# Patient Record
(demographics unavailable — no encounter records)

---

## 2024-10-18 NOTE — DISCUSSION/SUMMARY
[Doing Well] : is doing well [Excellent Pain Control] : has excellent pain control [No Sign of Infection] : is showing no signs of infection [Clinical Recheck] : clinical recheck [FreeTextEntry1] : Independent review of imaging and independent interpretation was performed at today's visit:  9/21/24 chest XRAY at [facility] - Pulmonary nodules and groundglass opacities seen on the patient's prior CT scans are not demonstrated on this modality. - No acute cardiopulmonary pathology

## 2024-10-18 NOTE — REASON FOR VISIT
[de-identified] : Flex Bronch, Right Robotic Asissted Right Upper Lobectomy with Mediastinal LN Dissection [de-identified] : 8/7/24 [de-identified] : Final pathology shows B-cell lymphoma.  Pt is doing well clinically.  She complains of an intermittent dry cough.

## 2024-10-18 NOTE — ASSESSMENT
[FreeTextEntry1] : Ms. Cazares has a diagnosis of B-cell lymphoma.  She is being followed by Dr. Alexander of medical oncology.  She will have a PET scan in November and return to me in December.

## 2024-10-18 NOTE — DISCUSSION/SUMMARY
[Doing Well] : is doing well [Excellent Pain Control] : has excellent pain control [No Sign of Infection] : is showing no signs of infection [Clinical Recheck] : clinical recheck [FreeTextEntry1] : Independent review of imaging and independent interpretation was performed at today's visit:  9/21/24 chest XRAY at [facility] - Pulmonary nodules and groundglass opacities seen on the patient's prior CT scans are not demonstrated on this modality. - No acute cardiopulmonary pathology

## 2024-10-18 NOTE — REASON FOR VISIT
[de-identified] : Flex Bronch, Right Robotic Asissted Right Upper Lobectomy with Mediastinal LN Dissection [de-identified] : 8/7/24 [de-identified] : Final pathology shows B-cell lymphoma.  Pt is doing well clinically.  She complains of an intermittent dry cough.